# Patient Record
Sex: FEMALE
[De-identification: names, ages, dates, MRNs, and addresses within clinical notes are randomized per-mention and may not be internally consistent; named-entity substitution may affect disease eponyms.]

---

## 2022-05-24 PROBLEM — Z00.00 ENCOUNTER FOR PREVENTIVE HEALTH EXAMINATION: Status: ACTIVE | Noted: 2022-05-24

## 2022-06-20 ENCOUNTER — APPOINTMENT (OUTPATIENT)
Dept: OBGYN | Facility: CLINIC | Age: 27
End: 2022-06-20

## 2024-08-11 ENCOUNTER — NON-APPOINTMENT (OUTPATIENT)
Age: 29
End: 2024-08-11

## 2024-08-12 ENCOUNTER — LABORATORY RESULT (OUTPATIENT)
Age: 29
End: 2024-08-12

## 2024-08-12 ENCOUNTER — APPOINTMENT (OUTPATIENT)
Dept: OBGYN | Facility: CLINIC | Age: 29
End: 2024-08-12
Payer: MEDICAID

## 2024-08-12 VITALS
OXYGEN SATURATION: 99 % | BODY MASS INDEX: 29.84 KG/M2 | HEART RATE: 98 BPM | DIASTOLIC BLOOD PRESSURE: 80 MMHG | HEIGHT: 60 IN | SYSTOLIC BLOOD PRESSURE: 124 MMHG | WEIGHT: 152 LBS

## 2024-08-12 DIAGNOSIS — J45.909 UNSPECIFIED ASTHMA, UNCOMPLICATED: ICD-10-CM

## 2024-08-12 DIAGNOSIS — O09.33 SUPERVISION OF PREGNANCY WITH INSUFFICIENT ANTENATAL CARE, THIRD TRIMESTER: ICD-10-CM

## 2024-08-12 PROCEDURE — 0500F INITIAL PRENATAL CARE VISIT: CPT

## 2024-08-12 RX ORDER — PROGESTERONE 200 MG/1
CAPSULE ORAL
Refills: 0 | Status: ACTIVE | COMMUNITY

## 2024-08-12 RX ORDER — FOLIC ACID 20 MG
CAPSULE ORAL
Refills: 0 | Status: ACTIVE | COMMUNITY

## 2024-08-13 ENCOUNTER — ASOB RESULT (OUTPATIENT)
Age: 29
End: 2024-08-13

## 2024-08-13 ENCOUNTER — APPOINTMENT (OUTPATIENT)
Dept: ANTEPARTUM | Facility: CLINIC | Age: 29
End: 2024-08-13
Payer: MEDICAID

## 2024-08-13 ENCOUNTER — TRANSCRIPTION ENCOUNTER (OUTPATIENT)
Age: 29
End: 2024-08-13

## 2024-08-13 LAB
BV BACTERIA RRNA VAG QL NAA+PROBE: NOT DETECTED
C GLABRATA RNA VAG QL NAA+PROBE: NOT DETECTED
C TRACH RRNA SPEC QL NAA+PROBE: NOT DETECTED
CANDIDA RRNA VAG QL PROBE: NOT DETECTED
N GONORRHOEA RRNA SPEC QL NAA+PROBE: NOT DETECTED
T VAGINALIS RRNA SPEC QL NAA+PROBE: NOT DETECTED

## 2024-08-13 PROCEDURE — 76819 FETAL BIOPHYS PROFIL W/O NST: CPT

## 2024-08-13 PROCEDURE — 76805 OB US >/= 14 WKS SNGL FETUS: CPT

## 2024-08-14 ENCOUNTER — NON-APPOINTMENT (OUTPATIENT)
Age: 29
End: 2024-08-14

## 2024-08-19 ENCOUNTER — LABORATORY RESULT (OUTPATIENT)
Age: 29
End: 2024-08-19

## 2024-08-19 ENCOUNTER — APPOINTMENT (OUTPATIENT)
Dept: OBGYN | Facility: CLINIC | Age: 29
End: 2024-08-19
Payer: MEDICAID

## 2024-08-19 VITALS
HEART RATE: 116 BPM | SYSTOLIC BLOOD PRESSURE: 129 MMHG | OXYGEN SATURATION: 100 % | WEIGHT: 153 LBS | BODY MASS INDEX: 29.88 KG/M2 | DIASTOLIC BLOOD PRESSURE: 81 MMHG

## 2024-08-19 PROCEDURE — 0502F SUBSEQUENT PRENATAL CARE: CPT

## 2024-08-22 DIAGNOSIS — O23.43 UNSPECIFIED INFECTION OF URINARY TRACT IN PREGNANCY, THIRD TRIMESTER: ICD-10-CM

## 2024-08-22 RX ORDER — CEPHALEXIN 250 MG/1
250 TABLET ORAL EVERY 6 HOURS
Qty: 20 | Refills: 0 | Status: ACTIVE | COMMUNITY
Start: 2024-08-22 | End: 1900-01-01

## 2024-08-26 ENCOUNTER — NON-APPOINTMENT (OUTPATIENT)
Age: 29
End: 2024-08-26

## 2024-08-27 ENCOUNTER — APPOINTMENT (OUTPATIENT)
Dept: OBGYN | Facility: CLINIC | Age: 29
End: 2024-08-27
Payer: MEDICAID

## 2024-08-27 ENCOUNTER — LABORATORY RESULT (OUTPATIENT)
Age: 29
End: 2024-08-27

## 2024-08-27 VITALS — WEIGHT: 155 LBS | DIASTOLIC BLOOD PRESSURE: 74 MMHG | SYSTOLIC BLOOD PRESSURE: 114 MMHG | BODY MASS INDEX: 30.27 KG/M2

## 2024-08-27 PROCEDURE — 0502F SUBSEQUENT PRENATAL CARE: CPT

## 2024-08-30 ENCOUNTER — NON-APPOINTMENT (OUTPATIENT)
Age: 29
End: 2024-08-30

## 2024-09-03 ENCOUNTER — APPOINTMENT (OUTPATIENT)
Dept: OBGYN | Facility: CLINIC | Age: 29
End: 2024-09-03
Payer: MEDICAID

## 2024-09-03 VITALS
BODY MASS INDEX: 30.86 KG/M2 | HEART RATE: 90 BPM | WEIGHT: 158 LBS | OXYGEN SATURATION: 100 % | SYSTOLIC BLOOD PRESSURE: 115 MMHG | DIASTOLIC BLOOD PRESSURE: 75 MMHG

## 2024-09-03 PROCEDURE — 0502F SUBSEQUENT PRENATAL CARE: CPT

## 2024-09-03 PROCEDURE — 59425 ANTEPARTUM CARE ONLY: CPT

## 2024-09-08 ENCOUNTER — INPATIENT (INPATIENT)
Facility: HOSPITAL | Age: 29
LOS: 2 days | Discharge: HOME | End: 2024-09-11
Attending: OBSTETRICS & GYNECOLOGY | Admitting: OBSTETRICS & GYNECOLOGY
Payer: MEDICAID

## 2024-09-08 VITALS
RESPIRATION RATE: 18 BRPM | HEART RATE: 57 BPM | DIASTOLIC BLOOD PRESSURE: 83 MMHG | OXYGEN SATURATION: 100 % | SYSTOLIC BLOOD PRESSURE: 125 MMHG | TEMPERATURE: 99 F

## 2024-09-08 DIAGNOSIS — Z98.890 OTHER SPECIFIED POSTPROCEDURAL STATES: Chronic | ICD-10-CM

## 2024-09-08 DIAGNOSIS — Z34.90 ENCOUNTER FOR SUPERVISION OF NORMAL PREGNANCY, UNSPECIFIED, UNSPECIFIED TRIMESTER: ICD-10-CM

## 2024-09-08 DIAGNOSIS — Z34.80 ENCOUNTER FOR SUPERVISION OF OTHER NORMAL PREGNANCY, UNSPECIFIED TRIMESTER: ICD-10-CM

## 2024-09-08 DIAGNOSIS — O26.899 OTHER SPECIFIED PREGNANCY RELATED CONDITIONS, UNSPECIFIED TRIMESTER: ICD-10-CM

## 2024-09-08 LAB
AMNISURE ROM (RUPTURE OF MEMBRANES): POSITIVE
APTT BLD: 26.9 SEC — SIGNIFICANT CHANGE UP (ref 24.5–35.6)
BASOPHILS # BLD AUTO: 0.05 K/UL — SIGNIFICANT CHANGE UP (ref 0–0.2)
BASOPHILS NFR BLD AUTO: 0.6 % — SIGNIFICANT CHANGE UP (ref 0–2)
EOSINOPHIL # BLD AUTO: 0.03 K/UL — SIGNIFICANT CHANGE UP (ref 0–0.5)
EOSINOPHIL NFR BLD AUTO: 0.4 % — SIGNIFICANT CHANGE UP (ref 0–6)
FIBRINOGEN PPP-MCNC: 492 MG/DL — HIGH (ref 200–475)
HCT VFR BLD CALC: 38.6 % — SIGNIFICANT CHANGE UP (ref 34.5–45)
HGB BLD-MCNC: 13.5 G/DL — SIGNIFICANT CHANGE UP (ref 11.5–15.5)
IMM GRANULOCYTES NFR BLD AUTO: 2 % — HIGH (ref 0–0.9)
INR BLD: 0.86 RATIO — SIGNIFICANT CHANGE UP (ref 0.85–1.18)
LYMPHOCYTES # BLD AUTO: 1.92 K/UL — SIGNIFICANT CHANGE UP (ref 1–3.3)
LYMPHOCYTES # BLD AUTO: 23.1 % — SIGNIFICANT CHANGE UP (ref 13–44)
MCHC RBC-ENTMCNC: 32.8 PG — SIGNIFICANT CHANGE UP (ref 27–34)
MCHC RBC-ENTMCNC: 35 GM/DL — SIGNIFICANT CHANGE UP (ref 32–36)
MCV RBC AUTO: 93.7 FL — SIGNIFICANT CHANGE UP (ref 80–100)
MONOCYTES # BLD AUTO: 0.84 K/UL — SIGNIFICANT CHANGE UP (ref 0–0.9)
MONOCYTES NFR BLD AUTO: 10.1 % — SIGNIFICANT CHANGE UP (ref 2–14)
NEUTROPHILS # BLD AUTO: 5.29 K/UL — SIGNIFICANT CHANGE UP (ref 1.8–7.4)
NEUTROPHILS NFR BLD AUTO: 63.8 % — SIGNIFICANT CHANGE UP (ref 43–77)
NRBC # BLD: 0 /100 WBCS — SIGNIFICANT CHANGE UP (ref 0–0)
PLATELET # BLD AUTO: 250 K/UL — SIGNIFICANT CHANGE UP (ref 150–400)
PROTHROM AB SERPL-ACNC: 9.8 SEC — SIGNIFICANT CHANGE UP (ref 9.5–13)
RBC # BLD: 4.12 M/UL — SIGNIFICANT CHANGE UP (ref 3.8–5.2)
RBC # FLD: 12.7 % — SIGNIFICANT CHANGE UP (ref 10.3–14.5)
WBC # BLD: 8.3 K/UL — SIGNIFICANT CHANGE UP (ref 3.8–10.5)
WBC # FLD AUTO: 8.3 K/UL — SIGNIFICANT CHANGE UP (ref 3.8–10.5)

## 2024-09-08 PROCEDURE — 59410 OBSTETRICAL CARE: CPT | Mod: U9

## 2024-09-08 RX ORDER — AMPICILLIN TRIHYDRATE 500 MG
2 CAPSULE ORAL ONCE
Refills: 0 | Status: COMPLETED | OUTPATIENT
Start: 2024-09-08 | End: 2024-09-08

## 2024-09-08 RX ORDER — AMPICILLIN TRIHYDRATE 500 MG
1 CAPSULE ORAL EVERY 4 HOURS
Refills: 0 | Status: DISCONTINUED | OUTPATIENT
Start: 2024-09-08 | End: 2024-09-09

## 2024-09-08 RX ORDER — OXYTOCIN 10 UNIT/ML
333.33 AMPUL (ML) INJECTION
Qty: 20 | Refills: 0 | Status: COMPLETED | OUTPATIENT
Start: 2024-09-08

## 2024-09-08 RX ORDER — CHLORHEXIDINE GLUCONATE 40 MG/ML
1 SOLUTION TOPICAL DAILY
Refills: 0 | Status: DISCONTINUED | OUTPATIENT
Start: 2024-09-08 | End: 2024-09-09

## 2024-09-08 RX ADMIN — Medication 108 GRAM(S): at 23:35

## 2024-09-08 RX ADMIN — Medication 200 GRAM(S): at 19:25

## 2024-09-08 NOTE — OB RN TRIAGE NOTE - FALL HARM RISK - UNIVERSAL INTERVENTIONS
Bed in lowest position, wheels locked, appropriate side rails in place/Call bell, personal items and telephone in reach/Instruct patient to call for assistance before getting out of bed or chair/Non-slip footwear when patient is out of bed/[place] to call system/Physically safe environment - no spills, clutter or unnecessary equipment/Purposeful Proactive Rounding/Room/bathroom lighting operational, light cord in reach

## 2024-09-08 NOTE — OB PROVIDER LABOR PROGRESS NOTE - ASSESSMENT
29yrs  edc 2024 ega [id number] with spontaneous rupture of membranes at9.30am.on oral cytotec  plan for continue Cytotec induction

## 2024-09-08 NOTE — OB PROVIDER H&P - PROBLEM SELECTOR PLAN 1
Admit and consent  IV fluids and initial bloodwork  Continuous monitoring fht, toco, vitals  Pain management as needed  Ampicillin for GBS status  PO cytotec protocol

## 2024-09-08 NOTE — OB PROVIDER H&P - NSLOWPPHRISK_OBGYN_A_OB
No previous uterine incision/[place] Pregnancy/Less than or equal to 4 previous vaginal births/No known bleeding disorder/No history of postpartum hemorrhage/No other PPH risks indicated

## 2024-09-08 NOTE — OB PROVIDER TRIAGE NOTE - NSOBPROVIDERNOTE_OBGYN_ALL_OB_FT
29 year old  at 38w1d for rule out rupture.  Reassuring fetal status. 29 year old  at 38w1d for rule out rupture.  Reassuring fetal status.  Amnisure positive, GBS positive  For admission for delivery

## 2024-09-08 NOTE — OB PROVIDER TRIAGE NOTE - HISTORY OF PRESENT ILLNESS
Patient is a 29 year old  at 38w1d who presents to triage with complaint of leakage of fluid at 930am.  Denies vaginal bleeding, abdominal or pelvic pain, change in bowel or bladder habits or any other concerns.    PNC with Dr Porras  PMhx; Denies  SxHx: D&C x1  Meds: PNV  Allergies; NKDA   OBhx;   Preg 1- 2022- First trimester SAB with D&C  GynHx: Normal menses, one partner, no stds, no cysts, no fibroids, normal paps  Socialhx: neg x 3, no anxiety or depression

## 2024-09-08 NOTE — OB PROVIDER TRIAGE NOTE - NS_FETALPRESENTATIONA_OBGYN_ALL_OB
Progress Notes by Bozena Sanabria CPT at 05/30/17 01:57 PM     Author:  Bozena Sanabria CPT Service:  (none) Author Type:  Technician     Filed:  05/30/17 01:57 PM Encounter Date:  4/5/2017 Status:  Signed     :  Bozena Sanabria CPT (Technician)            Patient returned lens[AC1.1M]      Revision History        User Key Date/Time User Provider Type Action    > AC1.1 05/30/17 01:57 PM Bozena Sanabria CPT Technician Sign    M - Manual             Cephalic

## 2024-09-08 NOTE — OB PROVIDER LABOR PROGRESS NOTE - ASSESSMENT
30 yo  @ 38.1 weeks, admitted for PROM, efw 3600, GBS positive   Patient discussed at  huddle   s/p PO cytotec 20 mg dose #1    - continue close maternal and fetal monitoring   - ampicillin   - PO cytotec protocol   - pain management per patient request   - NST reviewed   Dr. Laura thomas  28 yo  @ 38.1 weeks, admitted for PROM, efw 3600, GBS positive   Patient discussed at  huddle   s/p PO cytotec 20 mg dose #1    - continue close maternal and fetal monitoring   - ampicillin   - PO cytotec protocol   - pain management per patient request   - NST reviewed

## 2024-09-09 ENCOUNTER — APPOINTMENT (OUTPATIENT)
Dept: OBGYN | Facility: CLINIC | Age: 29
End: 2024-09-09

## 2024-09-09 LAB
ABO RH CONFIRMATION: SIGNIFICANT CHANGE UP
T PALLIDUM AB TITR SER: NEGATIVE — SIGNIFICANT CHANGE UP

## 2024-09-09 RX ORDER — OXYTOCIN 10 UNIT/ML
AMPUL (ML) INJECTION
Qty: 30 | Refills: 0 | Status: DISCONTINUED | OUTPATIENT
Start: 2024-09-09 | End: 2024-09-09

## 2024-09-09 RX ORDER — OXYCODONE HYDROCHLORIDE 5 MG/1
5 TABLET ORAL
Refills: 0 | Status: DISCONTINUED | OUTPATIENT
Start: 2024-09-09 | End: 2024-09-11

## 2024-09-09 RX ORDER — MENTHOL/CETYLPYRD CL 3 MG
1 LOZENGE MUCOUS MEMBRANE EVERY 6 HOURS
Refills: 0 | Status: DISCONTINUED | OUTPATIENT
Start: 2024-09-09 | End: 2024-09-11

## 2024-09-09 RX ORDER — KETOROLAC TROMETHAMINE 30 MG/ML
30 INJECTION, SOLUTION INTRAMUSCULAR ONCE
Refills: 0 | Status: DISCONTINUED | OUTPATIENT
Start: 2024-09-09 | End: 2024-09-09

## 2024-09-09 RX ORDER — OXYCODONE HYDROCHLORIDE 5 MG/1
5 TABLET ORAL ONCE
Refills: 0 | Status: DISCONTINUED | OUTPATIENT
Start: 2024-09-09 | End: 2024-09-11

## 2024-09-09 RX ORDER — TETANUS TOXOID, REDUCED DIPHTHERIA TOXOID AND ACELLULAR PERTUSSIS VACCINE, ADSORBED 5; 2.5; 8; 8; 2.5 [IU]/.5ML; [IU]/.5ML; UG/.5ML; UG/.5ML; UG/.5ML
0.5 SUSPENSION INTRAMUSCULAR ONCE
Refills: 0 | Status: DISCONTINUED | OUTPATIENT
Start: 2024-09-09 | End: 2024-09-11

## 2024-09-09 RX ORDER — ACETAMINOPHEN 325 MG/1
975 TABLET ORAL
Refills: 0 | Status: DISCONTINUED | OUTPATIENT
Start: 2024-09-09 | End: 2024-09-11

## 2024-09-09 RX ORDER — DIPHENHYDRAMINE HCL 50 MG
25 CAPSULE ORAL EVERY 6 HOURS
Refills: 0 | Status: DISCONTINUED | OUTPATIENT
Start: 2024-09-09 | End: 2024-09-11

## 2024-09-09 RX ORDER — LANOLIN
1 OINTMENT (GRAM) TOPICAL EVERY 6 HOURS
Refills: 0 | Status: DISCONTINUED | OUTPATIENT
Start: 2024-09-09 | End: 2024-09-11

## 2024-09-09 RX ORDER — IBUPROFEN 600 MG
600 TABLET ORAL EVERY 6 HOURS
Refills: 0 | Status: COMPLETED | OUTPATIENT
Start: 2024-09-09 | End: 2025-08-08

## 2024-09-09 RX ORDER — VITAMIN A, ASCORBIC ACID, VITAMIN D, .ALPHA.-TOCOPHEROL, THIAMINE MONONITRATE, RIBOFLAVIN, NIACIN, PYRIDOXINE HYDROCHLORIDE, FOLIC ACID, CYANOCOBALAMIN, CALCIUM, IRON, MAGNESIUM, ZINC, AND COPPER 2700; 70; 400; 30; 1.6; 1.8; 18; 2.5; 1; 12; 100; 65; 25; 25; 2 [IU]/1; MG/1; [IU]/1; [IU]/1; MG/1; MG/1; MG/1; MG/1; MG/1; UG/1; MG/1; MG/1; MG/1; MG/1; MG/1
1 TABLET ORAL DAILY
Refills: 0 | Status: DISCONTINUED | OUTPATIENT
Start: 2024-09-09 | End: 2024-09-11

## 2024-09-09 RX ORDER — OXYTOCIN 10 UNIT/ML
41.67 AMPUL (ML) INJECTION
Qty: 20 | Refills: 0 | Status: DISCONTINUED | OUTPATIENT
Start: 2024-09-09 | End: 2024-09-11

## 2024-09-09 RX ORDER — PRAMOXINE HCL 1 %
1 GEL (GRAM) TOPICAL EVERY 4 HOURS
Refills: 0 | Status: DISCONTINUED | OUTPATIENT
Start: 2024-09-09 | End: 2024-09-11

## 2024-09-09 RX ORDER — ACETAMINOPHEN 325 MG/1
1000 TABLET ORAL ONCE
Refills: 0 | Status: COMPLETED | OUTPATIENT
Start: 2024-09-09 | End: 2024-09-09

## 2024-09-09 RX ORDER — WITCH HAZEL 1 G/ML
1 LIQUID TOPICAL EVERY 4 HOURS
Refills: 0 | Status: DISCONTINUED | OUTPATIENT
Start: 2024-09-09 | End: 2024-09-11

## 2024-09-09 RX ORDER — SODIUM CHLORIDE 9 MG/ML
3 INJECTION INTRAMUSCULAR; INTRAVENOUS; SUBCUTANEOUS EVERY 8 HOURS
Refills: 0 | Status: DISCONTINUED | OUTPATIENT
Start: 2024-09-09 | End: 2024-09-11

## 2024-09-09 RX ORDER — HYDROCORTISONE 1 %
1 OINTMENT (GRAM) TOPICAL EVERY 6 HOURS
Refills: 0 | Status: DISCONTINUED | OUTPATIENT
Start: 2024-09-09 | End: 2024-09-11

## 2024-09-09 RX ORDER — OXYTOCIN 10 UNIT/ML
333.33 AMPUL (ML) INJECTION
Qty: 20 | Refills: 0 | Status: DISCONTINUED | OUTPATIENT
Start: 2024-09-09 | End: 2024-09-11

## 2024-09-09 RX ADMIN — ACETAMINOPHEN 1000 MILLIGRAM(S): 325 TABLET ORAL at 23:45

## 2024-09-09 RX ADMIN — Medication 108 GRAM(S): at 03:35

## 2024-09-09 RX ADMIN — Medication 125 MILLILITER(S): at 11:45

## 2024-09-09 RX ADMIN — KETOROLAC TROMETHAMINE 30 MILLIGRAM(S): 30 INJECTION, SOLUTION INTRAMUSCULAR at 21:45

## 2024-09-09 RX ADMIN — Medication 108 GRAM(S): at 11:40

## 2024-09-09 RX ADMIN — KETOROLAC TROMETHAMINE 30 MILLIGRAM(S): 30 INJECTION, SOLUTION INTRAMUSCULAR at 22:45

## 2024-09-09 RX ADMIN — Medication 125 MILLIUNIT(S)/MIN: at 21:25

## 2024-09-09 RX ADMIN — Medication 108 GRAM(S): at 15:45

## 2024-09-09 RX ADMIN — ACETAMINOPHEN 400 MILLIGRAM(S): 325 TABLET ORAL at 22:46

## 2024-09-09 RX ADMIN — Medication 2 MILLIUNIT(S)/MIN: at 09:56

## 2024-09-09 RX ADMIN — Medication 108 GRAM(S): at 19:56

## 2024-09-09 RX ADMIN — Medication 108 GRAM(S): at 07:45

## 2024-09-09 NOTE — OB PROVIDER LABOR PROGRESS NOTE - NS_OBIHICONTRACTIONPATTERNDETAILS_OBGYN_ALL_OB_FT
irreg
irregular
irregular
monitoring mvu via IUPC
based on IUPC we will be monitoring the MVU
q 2 minutes

## 2024-09-09 NOTE — OB PROVIDER LABOR PROGRESS NOTE - ASSESSMENT
28yo  @ 38  weeks presents for MIOL with PROM clear at 0930; NST reactive; vitals stable       Plan:  1. Continue pitocin  2. Top off of epidural; anesthesia aware  3. Continue maternal and fetal monitoring   4. D/w Dr. Porras

## 2024-09-09 NOTE — OB PROVIDER LABOR PROGRESS NOTE - ASSESSMENT
28 yo  @ 38 1/ weeks presents for medical inducation of labor; PROM clear at 0930; NSt reactive; vital signs stable      Plan:  1. IUPC placement  2. If MVU is les than 200 we will consider augmentation with pitocin  3. Continue maternal and fetal monitoring  4. D/w Dr. Porras

## 2024-09-09 NOTE — OB PROVIDER LABOR PROGRESS NOTE - ASSESSMENT
30 yo  @ 38  weeks presents for medical induction of labor with PROM clear at 0930a; NST reactive; vital signs stable    Plan:  1. Continue maternal and fetal monitoring  2. Epidural top off with anesthesia aware  3. D/w Dr. Porras 28 yo  @ 38  weeks presents for medical induction of labor with PROM clear at 0930a; NST reactive; vital signs stable    Plan:  1. Continue pitocin  2. Continue maternal and fetal monitoring  3. Epidural top off with anesthesia aware  4. D/w Dr. Porras 30 yo  @ 38  weeks presents for medical induction of labor with PROM clear at 0930a; NST reactive; vital signs stable    Plan:  1. Continue pitocin  2. Continue maternal and fetal monitoring  3. Epidural top off with anesthesia aware  4. D/w Dr. Porras and states she is en route to hospital

## 2024-09-09 NOTE — OB RN DELIVERY SUMMARY - NS_RNDELIVATTEST_OBGYN_ALL_OB
OB Provider reported that the vagina was inspected and no foreign body was found/Laps, needles and instrument count was correct 01-Apr-2019 16:41

## 2024-09-09 NOTE — OB NEONATOLOGY/PEDIATRICIAN DELIVERY SUMMARY - NSPEDSNEONOTESA_OBGYN_ALL_OB_FT
Neonatologist (I) was called to attend this scheduled repeat . Maternal HIV negative, HBsAg negative, RPR non-reactive, Rubella immune, GBS positive s/p ampicillin x 2 doses. SROM ~ 12 hours prior to delivery.  Maternal Tmax 37.2C. EOS risk 0.14 based on incidence 0.1000 live births per [facility] calculator. The infant presented cephalic, emerged with good tone, spontaneous cry. Cord was clamped at 60 seconds of life. The infant was then placed skin to skin where he was warmed, dried, applied gentle tactile stimulation. Exam overall benign; notable for caput succedaneum.

## 2024-09-09 NOTE — OB RN DELIVERY SUMMARY - NS_SEPSISRSKCALC_OBGYN_ALL_OB_FT
EOS calculated successfully. EOS Risk Factor: 0.5/1000 live births ([facility] national incidence); GA=38w2d; Temp=99.5; ROM=35.45; GBS='Positive'; Antibiotics='GBS specific antibiotics > 2 hrs prior to birth'

## 2024-09-09 NOTE — OB PROVIDER LABOR PROGRESS NOTE - NS_OBIHIFHRDETAILS_OBGYN_ALL_OB_FT
140bpm at baseline with moderate variability without accels or decels
130bpm at baseline with moderate variability without accels or decels
130-140s with category 1 tracing
FHT: baseline: 120, moderate variability, + accels, no decels
cat I
cat 1 135bpm with moderate variability with accels no decels

## 2024-09-09 NOTE — OB PROVIDER LABOR PROGRESS NOTE - NS_SUBJECTIVE/OBJECTIVE_OBGYN_ALL_OB_FT
as per rn mvu <200    will start Pitocin as ordered by dr miller - on call for the practice
dr miller seen pt at the bedside    fetal tracing cat I reactive    toco via iupc     as per dr miller- give epidural reinforcement at this time allow to labor down
fetal tracing 135 min-mod variability no decels    toco via iupc    pit now 2mu
reassess: pt still station 0 to +1    at this time pt getting tired pushing    she has been pushing for 1hr 30min    - as per dr miller: rest for 30min/peanut
came with spontaneous rupture of membranes at  9.30am on 09/08/2024
patient is comfortable with epidural    suarez and iupc in place
Patient evaluated at bedside  Offers no new complaints at this time     VE deferred  Last SVE 1/50/-3 @ 8415
pt is sitting in bed complaining of mild pelvic pressure     Pt is comfortable with epidural    Monterroso in place
patient evaluated at bedside with OUMOU Colby     Patient complaining of increasing pain    Monterroso and IUPC in place    VE: 7.0/100/-2/forebag    AROM forebag at 1045 with clear fluid
pt reports a lot of pain

## 2024-09-09 NOTE — OB RN DELIVERY SUMMARY - NS_LABORDURATION_OBGYN_ALL_OB_FT
Return call to client and notified of message as below. Confirms that she is taking Clonazepam bid . States she has been on it and taking it this way ever since she was a patient of Dr. Fuentes.     Call only if problems,otherwise would like to  script at [facility] this evening.   24 Hour(s) 27 Minute(s)

## 2024-09-10 ENCOUNTER — APPOINTMENT (OUTPATIENT)
Dept: ANTEPARTUM | Facility: CLINIC | Age: 29
End: 2024-09-10

## 2024-09-10 ENCOUNTER — TRANSCRIPTION ENCOUNTER (OUTPATIENT)
Age: 29
End: 2024-09-10

## 2024-09-10 DIAGNOSIS — Z37.9 OUTCOME OF DELIVERY, UNSPECIFIED: ICD-10-CM

## 2024-09-10 LAB
BASOPHILS # BLD AUTO: 0.03 K/UL — SIGNIFICANT CHANGE UP (ref 0–0.2)
BASOPHILS NFR BLD AUTO: 0.2 % — SIGNIFICANT CHANGE UP (ref 0–2)
EOSINOPHIL # BLD AUTO: 0.03 K/UL — SIGNIFICANT CHANGE UP (ref 0–0.5)
EOSINOPHIL NFR BLD AUTO: 0.2 % — SIGNIFICANT CHANGE UP (ref 0–6)
HCT VFR BLD CALC: 33.4 % — LOW (ref 34.5–45)
HGB BLD-MCNC: 11.7 G/DL — SIGNIFICANT CHANGE UP (ref 11.5–15.5)
IMM GRANULOCYTES NFR BLD AUTO: 0.7 % — SIGNIFICANT CHANGE UP (ref 0–0.9)
LYMPHOCYTES # BLD AUTO: 1.71 K/UL — SIGNIFICANT CHANGE UP (ref 1–3.3)
LYMPHOCYTES # BLD AUTO: 10.6 % — LOW (ref 13–44)
MCHC RBC-ENTMCNC: 32.7 PG — SIGNIFICANT CHANGE UP (ref 27–34)
MCHC RBC-ENTMCNC: 35 GM/DL — SIGNIFICANT CHANGE UP (ref 32–36)
MCV RBC AUTO: 93.3 FL — SIGNIFICANT CHANGE UP (ref 80–100)
MONOCYTES # BLD AUTO: 0.74 K/UL — SIGNIFICANT CHANGE UP (ref 0–0.9)
MONOCYTES NFR BLD AUTO: 4.6 % — SIGNIFICANT CHANGE UP (ref 2–14)
NEUTROPHILS # BLD AUTO: 13.51 K/UL — HIGH (ref 1.8–7.4)
NEUTROPHILS NFR BLD AUTO: 83.7 % — HIGH (ref 43–77)
NRBC # BLD: 0 /100 WBCS — SIGNIFICANT CHANGE UP (ref 0–0)
PLATELET # BLD AUTO: 229 K/UL — SIGNIFICANT CHANGE UP (ref 150–400)
RBC # BLD: 3.58 M/UL — LOW (ref 3.8–5.2)
RBC # FLD: 12.8 % — SIGNIFICANT CHANGE UP (ref 10.3–14.5)
WBC # BLD: 16.14 K/UL — HIGH (ref 3.8–10.5)
WBC # FLD AUTO: 16.14 K/UL — HIGH (ref 3.8–10.5)

## 2024-09-10 RX ORDER — ACETAMINOPHEN 325 MG/1
2 TABLET ORAL
Qty: 30 | Refills: 0
Start: 2024-09-10

## 2024-09-10 RX ORDER — IBUPROFEN 600 MG
1 TABLET ORAL
Qty: 30 | Refills: 0
Start: 2024-09-10

## 2024-09-10 RX ORDER — DOCUSATE CALCIUM 240 MG/1
1 CAPSULE ORAL
Qty: 30 | Refills: 0
Start: 2024-09-10

## 2024-09-10 RX ORDER — VITAMIN A, ASCORBIC ACID, VITAMIN D, .ALPHA.-TOCOPHEROL, THIAMINE MONONITRATE, RIBOFLAVIN, NIACIN, PYRIDOXINE HYDROCHLORIDE, FOLIC ACID, CYANOCOBALAMIN, CALCIUM, IRON, MAGNESIUM, ZINC, AND COPPER 2700; 70; 400; 30; 1.6; 1.8; 18; 2.5; 1; 12; 100; 65; 25; 25; 2 [IU]/1; MG/1; [IU]/1; [IU]/1; MG/1; MG/1; MG/1; MG/1; MG/1; UG/1; MG/1; MG/1; MG/1; MG/1; MG/1
1 TABLET ORAL
Qty: 30 | Refills: 0
Start: 2024-09-10

## 2024-09-10 RX ORDER — IBUPROFEN 600 MG
600 TABLET ORAL EVERY 6 HOURS
Refills: 0 | Status: DISCONTINUED | OUTPATIENT
Start: 2024-09-10 | End: 2024-09-11

## 2024-09-10 RX ADMIN — ACETAMINOPHEN 975 MILLIGRAM(S): 325 TABLET ORAL at 18:53

## 2024-09-10 RX ADMIN — Medication 600 MILLIGRAM(S): at 23:16

## 2024-09-10 RX ADMIN — ACETAMINOPHEN 975 MILLIGRAM(S): 325 TABLET ORAL at 12:38

## 2024-09-10 RX ADMIN — Medication 600 MILLIGRAM(S): at 15:17

## 2024-09-10 RX ADMIN — SODIUM CHLORIDE 3 MILLILITER(S): 9 INJECTION INTRAMUSCULAR; INTRAVENOUS; SUBCUTANEOUS at 14:00

## 2024-09-10 RX ADMIN — SODIUM CHLORIDE 3 MILLILITER(S): 9 INJECTION INTRAMUSCULAR; INTRAVENOUS; SUBCUTANEOUS at 05:43

## 2024-09-10 RX ADMIN — SODIUM CHLORIDE 3 MILLILITER(S): 9 INJECTION INTRAMUSCULAR; INTRAVENOUS; SUBCUTANEOUS at 22:23

## 2024-09-10 RX ADMIN — ACETAMINOPHEN 975 MILLIGRAM(S): 325 TABLET ORAL at 06:00

## 2024-09-10 RX ADMIN — ACETAMINOPHEN 975 MILLIGRAM(S): 325 TABLET ORAL at 06:56

## 2024-09-10 RX ADMIN — VITAMIN A, ASCORBIC ACID, VITAMIN D, .ALPHA.-TOCOPHEROL, THIAMINE MONONITRATE, RIBOFLAVIN, NIACIN, PYRIDOXINE HYDROCHLORIDE, FOLIC ACID, CYANOCOBALAMIN, CALCIUM, IRON, MAGNESIUM, ZINC, AND COPPER 1 TABLET(S): 2700; 70; 400; 30; 1.6; 1.8; 18; 2.5; 1; 12; 100; 65; 25; 25; 2 TABLET ORAL at 12:38

## 2024-09-10 RX ADMIN — Medication 30 MILLILITER(S): at 23:16

## 2024-09-10 RX ADMIN — Medication 600 MILLIGRAM(S): at 16:17

## 2024-09-10 RX ADMIN — ACETAMINOPHEN 975 MILLIGRAM(S): 325 TABLET ORAL at 17:56

## 2024-09-10 RX ADMIN — ACETAMINOPHEN 975 MILLIGRAM(S): 325 TABLET ORAL at 13:38

## 2024-09-10 RX ADMIN — Medication 30 MILLILITER(S): at 10:20

## 2024-09-10 NOTE — DISCHARGE NOTE OB - CARE PLAN
1 Principal Discharge DX:	Vacuum-assisted vaginal delivery  Assessment and plan of treatment:	no sex, nothing in the vagina for 6 weeks, no strenuous activity, continue prenatal vitamins while breastfeeding, motrin prn for pain, f/u in the office in 5-6 weeks for postpartum visit

## 2024-09-10 NOTE — DISCHARGE NOTE OB - PATIENT PORTAL LINK FT
You can access the FollowMyHealth Patient Portal offered by [facility] by registering at the following website: [email or web address]. By joining [employer]’s FollowMyHealth portal, you will also be able to view your health information using other applications (apps) compatible with our system.

## 2024-09-10 NOTE — DISCHARGE NOTE OB - NS MD DC FALL RISK RISK
For information on Fall & Injury Prevention, visit: [email or web address] OR  [email or web address] OR  https://www.cdc.gov/steadi/patient.html

## 2024-09-10 NOTE — DISCHARGE NOTE OB - CARE PROVIDER_API CALL
Linsey Porras (MD; MS)  Obstetrics and Gynecology  [address], [address] [place] [address]  Phone: [phone]  Fax: [phone]  Follow Up Time: 1 month

## 2024-09-10 NOTE — DISCHARGE NOTE OB - MEDICATION SUMMARY - MEDICATIONS TO TAKE
I will START or STAY ON the medications listed below when I get home from the hospital:    ibuprofen 600 mg oral tablet  -- 1 tab(s) by mouth every 6 hours  -- Indication: For pain    Tylenol Extra Strength 500 mg oral tablet  -- 2 tab(s) by mouth every 6 hours  -- Indication: For pain    PreNatal Low Iron oral tablet  -- 1 tab(s) by mouth once a day  -- Indication: For Supplementation    Colace 100 mg oral capsule  -- 1 cap(s) by mouth 2 times a day  -- Indication: For constipation

## 2024-09-10 NOTE — DISCHARGE NOTE OB - MATERIALS PROVIDED
Vaccinations/[facility]  Screening Program/  Immunization Record/Breastfeeding Log/Bottle Feeding Log/Breastfeeding Mother’s Support Group Information/Guide to Postpartum Care/[facility] Hearing Screen Program/Back To Sleep Handout/Shaken Baby Prevention Handout/Breastfeeding Guide and Packet/Birth Certificate Instructions/Discharge Medication Information for Patients and Families Pocket Guide/Letter of Medical Neccessity

## 2024-09-11 VITALS
OXYGEN SATURATION: 98 % | TEMPERATURE: 98 F | SYSTOLIC BLOOD PRESSURE: 106 MMHG | RESPIRATION RATE: 18 BRPM | DIASTOLIC BLOOD PRESSURE: 67 MMHG | HEART RATE: 79 BPM

## 2024-09-11 DIAGNOSIS — Z87.09 PERSONAL HISTORY OF OTHER DISEASES OF THE RESPIRATORY SYSTEM: ICD-10-CM

## 2024-09-11 PROCEDURE — 86901 BLOOD TYPING SEROLOGIC RH(D): CPT

## 2024-09-11 PROCEDURE — 85730 THROMBOPLASTIN TIME PARTIAL: CPT

## 2024-09-11 PROCEDURE — 85384 FIBRINOGEN ACTIVITY: CPT

## 2024-09-11 PROCEDURE — 59050 FETAL MONITOR W/REPORT: CPT

## 2024-09-11 PROCEDURE — 86780 TREPONEMA PALLIDUM: CPT

## 2024-09-11 PROCEDURE — 86923 COMPATIBILITY TEST ELECTRIC: CPT

## 2024-09-11 PROCEDURE — 85025 COMPLETE CBC W/AUTO DIFF WBC: CPT

## 2024-09-11 PROCEDURE — 86900 BLOOD TYPING SEROLOGIC ABO: CPT

## 2024-09-11 PROCEDURE — 84112 EVAL AMNIOTIC FLUID PROTEIN: CPT

## 2024-09-11 PROCEDURE — 85610 PROTHROMBIN TIME: CPT

## 2024-09-11 PROCEDURE — 36415 COLL VENOUS BLD VENIPUNCTURE: CPT

## 2024-09-11 PROCEDURE — 86850 RBC ANTIBODY SCREEN: CPT

## 2024-09-11 RX ADMIN — Medication 600 MILLIGRAM(S): at 06:33

## 2024-09-11 RX ADMIN — SODIUM CHLORIDE 3 MILLILITER(S): 9 INJECTION INTRAMUSCULAR; INTRAVENOUS; SUBCUTANEOUS at 06:32

## 2024-09-11 RX ADMIN — Medication 600 MILLIGRAM(S): at 05:40

## 2024-09-11 RX ADMIN — Medication 600 MILLIGRAM(S): at 00:10

## 2024-09-11 NOTE — PROGRESS NOTE ADULT - ASSESSMENT
A/P:  PPD#1 s/p vacuum assisted vaginal delivery @38.2 wks, stable  
A/P: 30 yo PPD#2 s/p VAVD @ 38 2/7 weeks; hx of asthma with last attack 11/2023 and not currently on medications       - Discharge home with instructions after baby gets circumcision by Dr. Murillo  - Follow up in office in 5-6 weeks for postpartum visit  - Breastfeeding encouraged   - d/w dr Porras

## 2024-09-11 NOTE — LACTATION INITIAL EVALUATION - INTERVENTION OUTCOME
verbalizes understanding/demonstrates understanding of teaching/good return demonstration/needs met/discharge criteria met
verbalizes understanding/demonstrates understanding of teaching/good return demonstration/needs met

## 2024-09-11 NOTE — PROGRESS NOTE ADULT - SUBJECTIVE AND OBJECTIVE BOX
OB PA Progress Note    Patient seen at bedside with OUMOU Colby    Patient seen at bedside resting comfortably offers no current complaints. Ambulating and voiding without difficulty. Passing flatus and tolerating regular diet.  both breast/bottle feeding . Denies HA, CP, SOB, N/V/D, dizziness, palpitations,  worsening vaginal bleeding, or any other concerns.      Vital Signs Last 24 Hrs  T(C): 36.4 (10 Sep 2024 04:26), Max: 37.5 (09 Sep 2024 21:15)  T(F): 97.5 (10 Sep 2024 04:26), Max: 99.5 (09 Sep 2024 21:15)  HR: 93 (10 Sep 2024 04:26) (82 - 117)  BP: 114/73 (10 Sep 2024 04:26) (114/73 - 146/80)  BP(mean): 92 (10 Sep 2024 00:00) (85 - 105)  RR: 17 (10 Sep 2024 04:26) (16 - 22)  SpO2: 98% (10 Sep 2024 04:26) (97% - 99%)    Parameters below as of 10 Sep 2024 04:26  Patient On (Oxygen Delivery Method): room air    Physical Exam:     Gen: A&Ox 3, NAD  Chest: CTA B/L  Cardiac: S1,S2; RRR  Breast: Soft, nontender, nonengorged  Abdomen: +BS, Soft, nontender, ND; Fundus firm below umbilicus  Gyn: mod lochia  Ext: Nontender, no worsening edema                          11.7   16.14 )-----------( 229      ( 10 Sep 2024 06:02 )             33.4           
Patient seen at bedside resting comfortably offers no current complaints.  Ambulating and voiding without difficulty.  Passing flatus and tolerating regular diet.  both breast/bottle feeding.  Denies HA, CP, SOB, N/V/D, dizziness, palpitations, worsening abdominal pain, worsening vaginal bleeding, or any other concerns.      Vital Signs Last 24 Hrs  T(C): 36.6 (11 Sep 2024 05:50), Max: 37 (10 Sep 2024 17:20)  T(F): 97.9 (11 Sep 2024 05:50), Max: 98.6 (10 Sep 2024 17:20)  HR: 79 (11 Sep 2024 05:50) (79 - 89)  BP: 106/67 (11 Sep 2024 05:50) (106/67 - 113/73)  BP(mean): 84 (10 Sep 2024 11:35) (84 - 84)  RR: 18 (11 Sep 2024 05:50) (17 - 18)  SpO2: 98% (11 Sep 2024 05:50) (98% - 99%)    Parameters below as of 11 Sep 2024 05:50  Patient On (Oxygen Delivery Method): room air      Physical Exam:     Gen: A&Ox 3, NAD  Chest: CTA B/L  Cardiac: S1,S2; RRR  Breast: Soft, nontender, nonengorged  Abdomen: Soft, nontender, ND; Fundus firm below umbilicus  Gyn: Min lochia  Ext: Nontender, DTRS 2+, no worsening edema                          11.7   16.14 )-----------( 229      ( 10 Sep 2024 06:02 )             33.4

## 2024-09-11 NOTE — PROGRESS NOTE ADULT - PROBLEM SELECTOR PLAN 1
-Continue pain management  -Encourage OOB and ambulation  -Check CBC  -Continue current care  -Plan for discharge tomorrow  -d/w Dr. Porras
- Discharge home with instructions after baby gets circumcision  - Follow up in office in 5-6 weeks for postpartum visit  - Breastfeeding encouraged   - d/w dr Porras

## 2024-09-11 NOTE — LACTATION INITIAL EVALUATION - LACTATION INTERVENTIONS
Attended mother-baby breastfeeding/discharge class today; pt's questions/concerns regarding breastfeeding and general self and 's care addressed. Referral sent for tele-lactation program for breastfeeding f/u assessment and support post dc./initiate/review safe skin-to-skin/initiate/review hand expression/initiate/review pumping guidelines and safe milk handling/initiate/review techniques for position and latch/post discharge community resources provided/review techniques to increase milk supply/review techniques to manage sore nipples/engorgement/initiate/review breast massage/compression/reviewed components of an effective feeding and at least 8 effective feedings per day required/reviewed importance of monitoring infant diapers, the breastfeeding log, and minimum output each day/reviewed risks of unnecessary formula supplementation/reviewed benefits and recommendations for rooming in/reviewed feeding on demand/by cue at least 8 times a day/reviewed indications of inadequate milk transfer that would require supplementation
Mother taught benefits of breastfeeding, positioning and latch techniques, signs of good latch, milk transfer, feeding cues, cluster feedings and signs of satiety.  Reinforced importance of unlimited feeding on Cue at least 8-12X per 24 hours and of diaper count to assess for adequate intake. Mom requests formula. Explored reason and discussed risks of supplementing while breastfeeding without medical reason. Mom still chooses to supplement with formula. Explained risks of using artificial nipples and discussed options for using alternative feeding methods instead of nipple.  Safe formula preparation and feeding handout provided and discussed./initiate/review safe skin-to-skin/initiate/review hand expression/initiate/review techniques for position and latch/review techniques to increase milk supply/initiate/review breast massage/compression/reviewed components of an effective feeding and at least 8 effective feedings per day required/reviewed importance of monitoring infant diapers, the breastfeeding log, and minimum output each day/reviewed risks of unnecessary formula supplementation/reviewed benefits and recommendations for rooming in/reviewed feeding on demand/by cue at least 8 times a day/reviewed indications of inadequate milk transfer that would require supplementation

## 2024-09-11 NOTE — PROGRESS NOTE ADULT - PROBLEM SELECTOR PLAN 2
A/P: 30 yo PPD#2 s/p VAVD @ 38 2/7 weeks; hx of asthma with last attack 11/2023 and not currently on medications       - Discharge home with instructions after baby gets circumcision  - Follow up in office in 5-6 weeks for postpartum visit  - Breastfeeding encouraged   - d/w dr Porras

## 2024-10-07 ENCOUNTER — NON-APPOINTMENT (OUTPATIENT)
Age: 29
End: 2024-10-07

## 2024-10-07 ENCOUNTER — APPOINTMENT (OUTPATIENT)
Dept: OBGYN | Facility: CLINIC | Age: 29
End: 2024-10-07
Payer: MEDICAID

## 2024-10-07 VITALS
BODY MASS INDEX: 25.39 KG/M2 | OXYGEN SATURATION: 99 % | WEIGHT: 130 LBS | HEART RATE: 76 BPM | DIASTOLIC BLOOD PRESSURE: 80 MMHG | SYSTOLIC BLOOD PRESSURE: 114 MMHG

## 2024-10-07 PROCEDURE — 0503F POSTPARTUM CARE VISIT: CPT

## 2025-04-14 ENCOUNTER — APPOINTMENT (OUTPATIENT)
Dept: OBGYN | Facility: CLINIC | Age: 30
End: 2025-04-14

## 2025-05-22 ENCOUNTER — LABORATORY RESULT (OUTPATIENT)
Age: 30
End: 2025-05-22

## 2025-05-22 ENCOUNTER — APPOINTMENT (OUTPATIENT)
Dept: OBGYN | Facility: CLINIC | Age: 30
End: 2025-05-22
Payer: MEDICAID

## 2025-05-22 VITALS
HEART RATE: 70 BPM | OXYGEN SATURATION: 99 % | BODY MASS INDEX: 19.92 KG/M2 | DIASTOLIC BLOOD PRESSURE: 84 MMHG | WEIGHT: 102 LBS | SYSTOLIC BLOOD PRESSURE: 122 MMHG

## 2025-05-22 DIAGNOSIS — Z01.419 ENCOUNTER FOR GYNECOLOGICAL EXAMINATION (GENERAL) (ROUTINE) W/OUT ABNORMAL FINDINGS: ICD-10-CM

## 2025-05-22 PROCEDURE — 99395 PREV VISIT EST AGE 18-39: CPT

## 2025-05-23 PROBLEM — Z01.419 WOMEN'S ANNUAL ROUTINE GYNECOLOGICAL EXAMINATION: Status: ACTIVE | Noted: 2025-05-23
